# Patient Record
Sex: MALE | Race: BLACK OR AFRICAN AMERICAN | NOT HISPANIC OR LATINO | Employment: OTHER | URBAN - METROPOLITAN AREA
[De-identification: names, ages, dates, MRNs, and addresses within clinical notes are randomized per-mention and may not be internally consistent; named-entity substitution may affect disease eponyms.]

---

## 2023-01-20 PROBLEM — G57.10 MERALGIA PARAESTHETICA: Status: ACTIVE | Noted: 2023-01-20

## 2023-01-20 PROBLEM — E66.09 EXOGENOUS OBESITY: Status: ACTIVE | Noted: 2023-01-20

## 2023-01-20 PROBLEM — N28.9 RENAL INSUFFICIENCY: Status: ACTIVE | Noted: 2023-01-20

## 2023-01-20 PROBLEM — E11.9 TYPE 2 DIABETES MELLITUS (MULTI): Status: ACTIVE | Noted: 2023-01-20

## 2023-01-20 PROBLEM — K63.5 BENIGN COLON POLYP: Status: ACTIVE | Noted: 2023-01-20

## 2023-01-20 PROBLEM — E55.9 VITAMIN D DEFICIENCY: Status: ACTIVE | Noted: 2023-01-20

## 2023-01-20 PROBLEM — I10 HYPERTENSION: Status: ACTIVE | Noted: 2023-01-20

## 2023-01-20 PROBLEM — N40.0 BPH (BENIGN PROSTATIC HYPERPLASIA): Status: ACTIVE | Noted: 2023-01-20

## 2023-01-20 PROBLEM — R79.89 ABNORMAL LFTS: Status: ACTIVE | Noted: 2023-01-20

## 2023-01-20 PROBLEM — E78.5 HYPERLIPIDEMIA: Status: ACTIVE | Noted: 2023-01-20

## 2023-01-20 RX ORDER — ATORVASTATIN CALCIUM 20 MG/1
1 TABLET, FILM COATED ORAL DAILY
COMMUNITY
Start: 2020-02-12 | End: 2023-03-07 | Stop reason: SDUPTHER

## 2023-01-20 RX ORDER — DAPAGLIFLOZIN 10 MG/1
1 TABLET, FILM COATED ORAL
COMMUNITY
Start: 2022-09-13 | End: 2023-03-07 | Stop reason: SDUPTHER

## 2023-01-20 RX ORDER — LOSARTAN POTASSIUM 50 MG/1
1 TABLET ORAL DAILY
COMMUNITY
Start: 2021-05-17 | End: 2023-03-07 | Stop reason: SDUPTHER

## 2023-01-20 RX ORDER — TAMSULOSIN HYDROCHLORIDE 0.4 MG/1
2 CAPSULE ORAL DAILY
COMMUNITY
Start: 2021-01-11 | End: 2023-03-07 | Stop reason: SDUPTHER

## 2023-03-07 ENCOUNTER — OFFICE VISIT (OUTPATIENT)
Dept: PRIMARY CARE | Facility: CLINIC | Age: 68
End: 2023-03-07
Payer: MEDICARE

## 2023-03-07 VITALS
HEIGHT: 78 IN | SYSTOLIC BLOOD PRESSURE: 118 MMHG | DIASTOLIC BLOOD PRESSURE: 65 MMHG | WEIGHT: 315 LBS | HEART RATE: 64 BPM | BODY MASS INDEX: 36.45 KG/M2 | TEMPERATURE: 98.1 F

## 2023-03-07 DIAGNOSIS — I10 PRIMARY HYPERTENSION: ICD-10-CM

## 2023-03-07 DIAGNOSIS — M54.16 LUMBAR RADICULOPATHY, RIGHT: ICD-10-CM

## 2023-03-07 DIAGNOSIS — Z12.5 SCREENING FOR PROSTATE CANCER: ICD-10-CM

## 2023-03-07 DIAGNOSIS — E11.9 TYPE 2 DIABETES MELLITUS WITHOUT COMPLICATION, WITHOUT LONG-TERM CURRENT USE OF INSULIN (MULTI): Primary | ICD-10-CM

## 2023-03-07 DIAGNOSIS — E66.01 OBESITY, MORBID (MULTI): ICD-10-CM

## 2023-03-07 DIAGNOSIS — N28.9 RENAL INSUFFICIENCY: ICD-10-CM

## 2023-03-07 DIAGNOSIS — N40.1 BENIGN PROSTATIC HYPERPLASIA WITH LOWER URINARY TRACT SYMPTOMS, SYMPTOM DETAILS UNSPECIFIED: ICD-10-CM

## 2023-03-07 DIAGNOSIS — E11.9 TYPE 2 DIABETES MELLITUS WITHOUT COMPLICATION, WITHOUT LONG-TERM CURRENT USE OF INSULIN (MULTI): ICD-10-CM

## 2023-03-07 PROCEDURE — 1159F MED LIST DOCD IN RCRD: CPT | Performed by: INTERNAL MEDICINE

## 2023-03-07 PROCEDURE — 3074F SYST BP LT 130 MM HG: CPT | Performed by: INTERNAL MEDICINE

## 2023-03-07 PROCEDURE — 3078F DIAST BP <80 MM HG: CPT | Performed by: INTERNAL MEDICINE

## 2023-03-07 PROCEDURE — 4010F ACE/ARB THERAPY RXD/TAKEN: CPT | Performed by: INTERNAL MEDICINE

## 2023-03-07 PROCEDURE — 99214 OFFICE O/P EST MOD 30 MIN: CPT | Performed by: INTERNAL MEDICINE

## 2023-03-07 PROCEDURE — 1160F RVW MEDS BY RX/DR IN RCRD: CPT | Performed by: INTERNAL MEDICINE

## 2023-03-07 RX ORDER — ATORVASTATIN CALCIUM 20 MG/1
20 TABLET, FILM COATED ORAL DAILY
Qty: 90 TABLET | Refills: 3 | Status: SHIPPED | OUTPATIENT
Start: 2023-03-07 | End: 2023-03-10

## 2023-03-07 RX ORDER — LOSARTAN POTASSIUM 50 MG/1
50 TABLET ORAL DAILY
Qty: 90 TABLET | Refills: 3 | Status: SHIPPED | OUTPATIENT
Start: 2023-03-07 | End: 2024-03-07 | Stop reason: SDUPTHER

## 2023-03-07 RX ORDER — DAPAGLIFLOZIN 10 MG/1
10 TABLET, FILM COATED ORAL DAILY
Qty: 90 TABLET | Refills: 3 | Status: SHIPPED | OUTPATIENT
Start: 2023-03-07

## 2023-03-07 RX ORDER — TAMSULOSIN HYDROCHLORIDE 0.4 MG/1
0.8 CAPSULE ORAL DAILY
Qty: 180 CAPSULE | Refills: 3 | Status: SHIPPED | OUTPATIENT
Start: 2023-03-07 | End: 2024-03-07 | Stop reason: SDUPTHER

## 2023-03-07 ASSESSMENT — ENCOUNTER SYMPTOMS
POLYDIPSIA: 0
FEVER: 0
COUGH: 0
PALPITATIONS: 0
SHORTNESS OF BREATH: 0
CHILLS: 0

## 2023-03-07 NOTE — PROGRESS NOTES
"Subjective   Patient ID: Yasmani Muro is a 67 y.o. male who presents for Transfer Of Care (Coming from [place] refill on med).    67-year-old male presents today to establish care as a new patient after the penitentiary of his PCP.  He is a well-controlled diabetic with his last A1c being 6.5.  His last routine blood work was completed in June 2022.  He denies polyuria polydipsia vision changes or medication concerns at this time.  His medication list was reconciled and refills were sent to his preferred local pharmacy today.  He has no acute concerns which he wishes to address at this time.  He has previously completed colon cancer screening but the timing of which is unknown to me exactly at this time and will require further chart review.  He has never completed prostate cancer screening and I have educated the patient regarding this and encouraged him to initiate this with me today for the foreseeable future.    As part of today's visit I performed a detailed review of the patient's medical history and chart for what is available.  Total time of review was 8 minutes.         Review of Systems   Constitutional:  Negative for chills and fever.   Respiratory:  Negative for cough and shortness of breath.    Cardiovascular:  Negative for chest pain and palpitations.   Endocrine: Negative for polydipsia and polyuria.       Objective   /65   Pulse 64   Temp 36.7 °C (98.1 °F) (Temporal)   Ht 1.969 m (6' 5.5\")   Wt (!) 145 kg (318 lb 9.6 oz)   BMI 37.29 kg/m²     Physical Exam  Constitutional:       Appearance: Normal appearance.   HENT:      Head: Normocephalic and atraumatic.   Eyes:      Extraocular Movements: Extraocular movements intact.      Pupils: Pupils are equal, round, and reactive to light.   Neck:      Thyroid: No thyroid mass or thyromegaly.      Vascular: No carotid bruit.   Cardiovascular:      Rate and Rhythm: Normal rate and regular rhythm.      Heart sounds: No murmur heard.     No friction rub. No " gallop.   Pulmonary:      Effort: No respiratory distress.      Breath sounds: No wheezing, rhonchi or rales.   Musculoskeletal:      Cervical back: Neck supple.      Right lower leg: No edema.      Left lower leg: No edema.   Lymphadenopathy:      Cervical: No cervical adenopathy.   Neurological:      Mental Status: He is alert.         Assessment/Plan

## 2023-03-10 RX ORDER — ATORVASTATIN CALCIUM 20 MG/1
20 TABLET, FILM COATED ORAL DAILY
Qty: 90 TABLET | Refills: 2 | Status: SHIPPED | OUTPATIENT
Start: 2023-03-10 | End: 2024-03-04 | Stop reason: SDUPTHER

## 2023-03-14 ENCOUNTER — LAB (OUTPATIENT)
Dept: LAB | Facility: LAB | Age: 68
End: 2023-03-14
Payer: COMMERCIAL

## 2023-03-14 DIAGNOSIS — M54.16 LUMBAR RADICULOPATHY, RIGHT: ICD-10-CM

## 2023-03-14 DIAGNOSIS — I10 PRIMARY HYPERTENSION: ICD-10-CM

## 2023-03-14 DIAGNOSIS — E11.9 TYPE 2 DIABETES MELLITUS WITHOUT COMPLICATION, WITHOUT LONG-TERM CURRENT USE OF INSULIN (MULTI): ICD-10-CM

## 2023-03-14 DIAGNOSIS — N40.1 BENIGN PROSTATIC HYPERPLASIA WITH LOWER URINARY TRACT SYMPTOMS, SYMPTOM DETAILS UNSPECIFIED: ICD-10-CM

## 2023-03-14 DIAGNOSIS — N28.9 RENAL INSUFFICIENCY: ICD-10-CM

## 2023-03-14 DIAGNOSIS — Z12.5 SCREENING FOR PROSTATE CANCER: ICD-10-CM

## 2023-03-14 DIAGNOSIS — E66.01 OBESITY, MORBID (MULTI): ICD-10-CM

## 2023-03-14 LAB
ALANINE AMINOTRANSFERASE (SGPT) (U/L) IN SER/PLAS: 37 U/L (ref 10–52)
ALBUMIN (G/DL) IN SER/PLAS: 4.4 G/DL (ref 3.4–5)
ALBUMIN (MG/L) IN URINE: 97.4 MG/L
ALBUMIN/CREATININE (UG/MG) IN URINE: 66.7 UG/MG CRT (ref 0–30)
ALKALINE PHOSPHATASE (U/L) IN SER/PLAS: 62 U/L (ref 33–136)
ANION GAP IN SER/PLAS: 11 MMOL/L (ref 10–20)
ASPARTATE AMINOTRANSFERASE (SGOT) (U/L) IN SER/PLAS: 25 U/L (ref 9–39)
BILIRUBIN TOTAL (MG/DL) IN SER/PLAS: 0.7 MG/DL (ref 0–1.2)
CALCIUM (MG/DL) IN SER/PLAS: 9.6 MG/DL (ref 8.6–10.6)
CARBON DIOXIDE, TOTAL (MMOL/L) IN SER/PLAS: 27 MMOL/L (ref 21–32)
CHLORIDE (MMOL/L) IN SER/PLAS: 108 MMOL/L (ref 98–107)
CREATININE (MG/DL) IN SER/PLAS: 1.64 MG/DL (ref 0.5–1.3)
CREATININE (MG/DL) IN URINE: 146 MG/DL (ref 20–370)
ESTIMATED AVERAGE GLUCOSE FOR HBA1C: 154 MG/DL
GFR MALE: 45 ML/MIN/1.73M2
GLUCOSE (MG/DL) IN SER/PLAS: 109 MG/DL (ref 74–99)
HEMOGLOBIN A1C/HEMOGLOBIN TOTAL IN BLOOD: 7 %
POTASSIUM (MMOL/L) IN SER/PLAS: 4.4 MMOL/L (ref 3.5–5.3)
PROSTATE SPECIFIC ANTIGEN,SCREEN: 0.3 NG/ML (ref 0–4)
PROTEIN TOTAL: 6.5 G/DL (ref 6.4–8.2)
SODIUM (MMOL/L) IN SER/PLAS: 142 MMOL/L (ref 136–145)
UREA NITROGEN (MG/DL) IN SER/PLAS: 17 MG/DL (ref 6–23)

## 2023-03-14 PROCEDURE — 82043 UR ALBUMIN QUANTITATIVE: CPT

## 2023-03-14 PROCEDURE — 84153 ASSAY OF PSA TOTAL: CPT

## 2023-03-14 PROCEDURE — 83036 HEMOGLOBIN GLYCOSYLATED A1C: CPT

## 2023-03-14 PROCEDURE — 80053 COMPREHEN METABOLIC PANEL: CPT

## 2023-03-14 PROCEDURE — 36415 COLL VENOUS BLD VENIPUNCTURE: CPT

## 2023-03-14 PROCEDURE — 82570 ASSAY OF URINE CREATININE: CPT

## 2023-03-15 NOTE — RESULT ENCOUNTER NOTE
Patient's labs show the following: Well-controlled sugar with an A1c of 7.0.  Stable chronic kidney disease.  Tiny bit of protein in the urine negative of still active diabetic kidney disease but he is on a good combination of medications to control this between the Farxiga and losartan.  At his next visit I want to recheck the urine testing for protein again and if it still there I would like to consider increasing the losartan a little bit

## 2023-03-15 NOTE — RESULT ENCOUNTER NOTE
Advanced arthritis of spine in the lumbar region. Coorelates to nerve pain of thigh, confirms suspicion of cause

## 2023-03-16 ENCOUNTER — TELEPHONE (OUTPATIENT)
Dept: PRIMARY CARE | Facility: CLINIC | Age: 68
End: 2023-03-16
Payer: MEDICARE

## 2023-03-16 NOTE — TELEPHONE ENCOUNTER
----- Message from Bebo Dawn MD sent at 3/15/2023  9:06 AM EDT -----  Patient's labs show the following: Well-controlled sugar with an A1c of 7.0.  Stable chronic kidney disease.  Tiny bit of protein in the urine negative of still active diabetic kidney disease but he is on a good combination of medications to control   this between the Farxiga and losartan.  At his next visit I want to recheck the urine testing for protein again and if it still there I would like to consider increasing the losartan a little bit

## 2023-09-07 ENCOUNTER — OFFICE VISIT (OUTPATIENT)
Dept: PRIMARY CARE | Facility: CLINIC | Age: 68
End: 2023-09-07
Payer: MEDICARE

## 2023-09-07 VITALS
DIASTOLIC BLOOD PRESSURE: 69 MMHG | BODY MASS INDEX: 35.49 KG/M2 | WEIGHT: 303.2 LBS | HEART RATE: 58 BPM | SYSTOLIC BLOOD PRESSURE: 128 MMHG

## 2023-09-07 DIAGNOSIS — N52.9 ERECTILE DISORDER: ICD-10-CM

## 2023-09-07 DIAGNOSIS — R80.9 TYPE 2 DIABETES MELLITUS WITH MICROALBUMINURIA, WITHOUT LONG-TERM CURRENT USE OF INSULIN (MULTI): Primary | ICD-10-CM

## 2023-09-07 DIAGNOSIS — I10 PRIMARY HYPERTENSION: ICD-10-CM

## 2023-09-07 DIAGNOSIS — E11.29 TYPE 2 DIABETES MELLITUS WITH MICROALBUMINURIA, WITHOUT LONG-TERM CURRENT USE OF INSULIN (MULTI): Primary | ICD-10-CM

## 2023-09-07 PROBLEM — N28.9 RENAL INSUFFICIENCY: Status: RESOLVED | Noted: 2023-01-20 | Resolved: 2023-09-07

## 2023-09-07 LAB
POC HEMOGLOBIN A1C: 6.7 % (ref 4.2–6.5)
POC HEMOGLOBIN A1C: 6.7 % (ref 4.2–6.5)

## 2023-09-07 PROCEDURE — 1160F RVW MEDS BY RX/DR IN RCRD: CPT | Performed by: INTERNAL MEDICINE

## 2023-09-07 PROCEDURE — 3074F SYST BP LT 130 MM HG: CPT | Performed by: INTERNAL MEDICINE

## 2023-09-07 PROCEDURE — 3078F DIAST BP <80 MM HG: CPT | Performed by: INTERNAL MEDICINE

## 2023-09-07 PROCEDURE — 1159F MED LIST DOCD IN RCRD: CPT | Performed by: INTERNAL MEDICINE

## 2023-09-07 PROCEDURE — 1036F TOBACCO NON-USER: CPT | Performed by: INTERNAL MEDICINE

## 2023-09-07 PROCEDURE — 99213 OFFICE O/P EST LOW 20 MIN: CPT | Performed by: INTERNAL MEDICINE

## 2023-09-07 PROCEDURE — 3051F HG A1C>EQUAL 7.0%<8.0%: CPT | Performed by: INTERNAL MEDICINE

## 2023-09-07 PROCEDURE — 83036 HEMOGLOBIN GLYCOSYLATED A1C: CPT | Performed by: INTERNAL MEDICINE

## 2023-09-07 PROCEDURE — 4010F ACE/ARB THERAPY RXD/TAKEN: CPT | Performed by: INTERNAL MEDICINE

## 2023-09-07 RX ORDER — SILDENAFIL 100 MG/1
100 TABLET, FILM COATED ORAL AS NEEDED
Qty: 30 TABLET | Refills: 1 | Status: SHIPPED | OUTPATIENT
Start: 2023-09-07 | End: 2024-09-06

## 2023-09-07 ASSESSMENT — ENCOUNTER SYMPTOMS
OCCASIONAL FEELINGS OF UNSTEADINESS: 0
DEPRESSION: 0
PALPITATIONS: 0
FEVER: 0
SHORTNESS OF BREATH: 0
POLYDIPSIA: 0
LOSS OF SENSATION IN FEET: 0
CHILLS: 0
COUGH: 0

## 2023-09-07 NOTE — PROGRESS NOTES
Subjective   Patient ID: Yasmani Muro is a 68 y.o. male who presents for Follow-up.    58-year-old type II diabetic presents for routine follow-up.  He has been doing well since her last encounter.  No medication complications issues or changes to date.  He has no symptoms of polyuria polydipsia urgency frequency UTIs or skin issues to report at this time.  Since her last visit his A1c is improved from 7.0-6.7.    I cannot find evidence for previous colon cancer screening test in the patient's records after detailed review.  Discussion with patient today he does report having previous testing done but cannot recall the exact information himself.  Is been at least 5 years based on the records I have alone.  He will review his old records and see if he can find information regarding it.  If he cannot or if he does and we can confirm dates future testing will be discussed at the next visit in 6 months.    He is requesting a prescription for erectile dysfunction.         Review of Systems   Constitutional:  Negative for chills and fever.   Respiratory:  Negative for cough and shortness of breath.    Cardiovascular:  Negative for chest pain and palpitations.   Endocrine: Negative for polydipsia and polyuria.       Objective   /69 (BP Location: Left arm)   Pulse 58   Wt 138 kg (303 lb 3.2 oz)   BMI 35.49 kg/m²     Physical Exam  Constitutional:       Appearance: Normal appearance.   HENT:      Head: Normocephalic and atraumatic.   Eyes:      Extraocular Movements: Extraocular movements intact.      Pupils: Pupils are equal, round, and reactive to light.   Neck:      Thyroid: No thyroid mass or thyromegaly.      Vascular: No carotid bruit.   Cardiovascular:      Rate and Rhythm: Normal rate and regular rhythm.      Heart sounds: No murmur heard.     No friction rub. No gallop.   Pulmonary:      Effort: No respiratory distress.      Breath sounds: No wheezing, rhonchi or rales.   Musculoskeletal:      Cervical  back: Neck supple.      Right lower leg: No edema.      Left lower leg: No edema.   Lymphadenopathy:      Cervical: No cervical adenopathy.   Neurological:      Mental Status: He is alert.         Assessment/Plan   Problem List Items Addressed This Visit       Hypertension    Type 2 diabetes mellitus (CMS/HCC) - Primary    Relevant Orders    POCT glycosylated hemoglobin (Hb A1C) manually resulted (Completed)     Other Visit Diagnoses       Erectile disorder        Relevant Medications    sildenafil (Viagra) 100 mg tablet

## 2024-03-04 DIAGNOSIS — E66.01 OBESITY, MORBID (MULTI): ICD-10-CM

## 2024-03-04 DIAGNOSIS — I10 PRIMARY HYPERTENSION: ICD-10-CM

## 2024-03-04 DIAGNOSIS — N28.9 RENAL INSUFFICIENCY: ICD-10-CM

## 2024-03-04 DIAGNOSIS — E11.9 TYPE 2 DIABETES MELLITUS WITHOUT COMPLICATION, WITHOUT LONG-TERM CURRENT USE OF INSULIN (MULTI): ICD-10-CM

## 2024-03-04 DIAGNOSIS — N40.1 BENIGN PROSTATIC HYPERPLASIA WITH LOWER URINARY TRACT SYMPTOMS, SYMPTOM DETAILS UNSPECIFIED: ICD-10-CM

## 2024-03-04 DIAGNOSIS — M54.16 LUMBAR RADICULOPATHY, RIGHT: ICD-10-CM

## 2024-03-04 DIAGNOSIS — Z12.5 SCREENING FOR PROSTATE CANCER: ICD-10-CM

## 2024-03-04 RX ORDER — ATORVASTATIN CALCIUM 20 MG/1
20 TABLET, FILM COATED ORAL DAILY
Qty: 90 TABLET | Refills: 0 | Status: SHIPPED | OUTPATIENT
Start: 2024-03-04 | End: 2024-03-07 | Stop reason: SDUPTHER

## 2024-03-07 ENCOUNTER — LAB (OUTPATIENT)
Dept: LAB | Facility: LAB | Age: 69
End: 2024-03-07
Payer: MEDICARE

## 2024-03-07 ENCOUNTER — OFFICE VISIT (OUTPATIENT)
Dept: PRIMARY CARE | Facility: CLINIC | Age: 69
End: 2024-03-07
Payer: MEDICARE

## 2024-03-07 VITALS
TEMPERATURE: 97.3 F | SYSTOLIC BLOOD PRESSURE: 132 MMHG | HEIGHT: 78 IN | BODY MASS INDEX: 36.33 KG/M2 | DIASTOLIC BLOOD PRESSURE: 75 MMHG | WEIGHT: 314 LBS | HEART RATE: 88 BPM

## 2024-03-07 DIAGNOSIS — M54.16 LUMBAR RADICULOPATHY, RIGHT: ICD-10-CM

## 2024-03-07 DIAGNOSIS — E11.29 TYPE 2 DIABETES MELLITUS WITH MICROALBUMINURIA, WITHOUT LONG-TERM CURRENT USE OF INSULIN (MULTI): Primary | ICD-10-CM

## 2024-03-07 DIAGNOSIS — G89.29 CHRONIC LEFT SHOULDER PAIN: ICD-10-CM

## 2024-03-07 DIAGNOSIS — E66.01 OBESITY, MORBID (MULTI): ICD-10-CM

## 2024-03-07 DIAGNOSIS — M25.512 CHRONIC LEFT SHOULDER PAIN: ICD-10-CM

## 2024-03-07 DIAGNOSIS — I10 PRIMARY HYPERTENSION: ICD-10-CM

## 2024-03-07 DIAGNOSIS — E11.29 TYPE 2 DIABETES MELLITUS WITH MICROALBUMINURIA, WITHOUT LONG-TERM CURRENT USE OF INSULIN (MULTI): ICD-10-CM

## 2024-03-07 DIAGNOSIS — Z12.5 SCREENING FOR PROSTATE CANCER: ICD-10-CM

## 2024-03-07 DIAGNOSIS — R80.9 TYPE 2 DIABETES MELLITUS WITH MICROALBUMINURIA, WITHOUT LONG-TERM CURRENT USE OF INSULIN (MULTI): Primary | ICD-10-CM

## 2024-03-07 DIAGNOSIS — E11.9 TYPE 2 DIABETES MELLITUS WITHOUT COMPLICATION, WITHOUT LONG-TERM CURRENT USE OF INSULIN (MULTI): ICD-10-CM

## 2024-03-07 DIAGNOSIS — R80.9 TYPE 2 DIABETES MELLITUS WITH MICROALBUMINURIA, WITHOUT LONG-TERM CURRENT USE OF INSULIN (MULTI): ICD-10-CM

## 2024-03-07 DIAGNOSIS — N40.1 BENIGN PROSTATIC HYPERPLASIA WITH LOWER URINARY TRACT SYMPTOMS, SYMPTOM DETAILS UNSPECIFIED: ICD-10-CM

## 2024-03-07 DIAGNOSIS — N28.9 RENAL INSUFFICIENCY: ICD-10-CM

## 2024-03-07 DIAGNOSIS — S46.812A STRAIN OF LEFT DELTOID MUSCLE, INITIAL ENCOUNTER: ICD-10-CM

## 2024-03-07 LAB
ALBUMIN SERPL BCP-MCNC: 4.5 G/DL (ref 3.4–5)
ALP SERPL-CCNC: 81 U/L (ref 33–136)
ALT SERPL W P-5'-P-CCNC: 38 U/L (ref 10–52)
ANION GAP SERPL CALC-SCNC: 13 MMOL/L (ref 10–20)
AST SERPL W P-5'-P-CCNC: 26 U/L (ref 9–39)
BILIRUB SERPL-MCNC: 0.6 MG/DL (ref 0–1.2)
BUN SERPL-MCNC: 16 MG/DL (ref 6–23)
CALCIUM SERPL-MCNC: 9.7 MG/DL (ref 8.6–10.6)
CHLORIDE SERPL-SCNC: 106 MMOL/L (ref 98–107)
CHOLEST SERPL-MCNC: 165 MG/DL (ref 0–199)
CHOLESTEROL/HDL RATIO: 2.9
CO2 SERPL-SCNC: 28 MMOL/L (ref 21–32)
CREAT SERPL-MCNC: 1.51 MG/DL (ref 0.5–1.3)
CREAT UR-MCNC: 107.1 MG/DL (ref 20–370)
EGFRCR SERPLBLD CKD-EPI 2021: 50 ML/MIN/1.73M*2
ERYTHROCYTE [DISTWIDTH] IN BLOOD BY AUTOMATED COUNT: 14.4 % (ref 11.5–14.5)
EST. AVERAGE GLUCOSE BLD GHB EST-MCNC: 151 MG/DL
GLUCOSE SERPL-MCNC: 127 MG/DL (ref 74–99)
HBA1C MFR BLD: 6.9 %
HCT VFR BLD AUTO: 44.9 % (ref 41–52)
HDLC SERPL-MCNC: 56.9 MG/DL
HGB BLD-MCNC: 14.1 G/DL (ref 13.5–17.5)
LDLC SERPL CALC-MCNC: 93 MG/DL
MCH RBC QN AUTO: 25.8 PG (ref 26–34)
MCHC RBC AUTO-ENTMCNC: 31.4 G/DL (ref 32–36)
MCV RBC AUTO: 82 FL (ref 80–100)
MICROALBUMIN UR-MCNC: 173.1 MG/L
MICROALBUMIN/CREAT UR: 161.6 UG/MG CREAT
NON HDL CHOLESTEROL: 108 MG/DL (ref 0–149)
NRBC BLD-RTO: 0 /100 WBCS (ref 0–0)
PLATELET # BLD AUTO: 258 X10*3/UL (ref 150–450)
POTASSIUM SERPL-SCNC: 4.5 MMOL/L (ref 3.5–5.3)
PROT SERPL-MCNC: 7.1 G/DL (ref 6.4–8.2)
PSA SERPL-MCNC: 0.31 NG/ML
RBC # BLD AUTO: 5.47 X10*6/UL (ref 4.5–5.9)
SODIUM SERPL-SCNC: 142 MMOL/L (ref 136–145)
TRIGL SERPL-MCNC: 76 MG/DL (ref 0–149)
VLDL: 15 MG/DL (ref 0–40)
WBC # BLD AUTO: 4.2 X10*3/UL (ref 4.4–11.3)

## 2024-03-07 PROCEDURE — 4010F ACE/ARB THERAPY RXD/TAKEN: CPT | Performed by: INTERNAL MEDICINE

## 2024-03-07 PROCEDURE — 1160F RVW MEDS BY RX/DR IN RCRD: CPT | Performed by: INTERNAL MEDICINE

## 2024-03-07 PROCEDURE — 99214 OFFICE O/P EST MOD 30 MIN: CPT | Performed by: INTERNAL MEDICINE

## 2024-03-07 PROCEDURE — 82570 ASSAY OF URINE CREATININE: CPT

## 2024-03-07 PROCEDURE — 85027 COMPLETE CBC AUTOMATED: CPT

## 2024-03-07 PROCEDURE — 36415 COLL VENOUS BLD VENIPUNCTURE: CPT

## 2024-03-07 PROCEDURE — 83036 HEMOGLOBIN GLYCOSYLATED A1C: CPT

## 2024-03-07 PROCEDURE — 3078F DIAST BP <80 MM HG: CPT | Performed by: INTERNAL MEDICINE

## 2024-03-07 PROCEDURE — 80061 LIPID PANEL: CPT

## 2024-03-07 PROCEDURE — 82043 UR ALBUMIN QUANTITATIVE: CPT

## 2024-03-07 PROCEDURE — 80053 COMPREHEN METABOLIC PANEL: CPT

## 2024-03-07 PROCEDURE — 1159F MED LIST DOCD IN RCRD: CPT | Performed by: INTERNAL MEDICINE

## 2024-03-07 PROCEDURE — 1036F TOBACCO NON-USER: CPT | Performed by: INTERNAL MEDICINE

## 2024-03-07 PROCEDURE — 3075F SYST BP GE 130 - 139MM HG: CPT | Performed by: INTERNAL MEDICINE

## 2024-03-07 PROCEDURE — G0103 PSA SCREENING: HCPCS

## 2024-03-07 RX ORDER — TAMSULOSIN HYDROCHLORIDE 0.4 MG/1
0.8 CAPSULE ORAL DAILY
Qty: 180 CAPSULE | Refills: 3 | Status: SHIPPED | OUTPATIENT
Start: 2024-03-07

## 2024-03-07 RX ORDER — LOSARTAN POTASSIUM 50 MG/1
50 TABLET ORAL DAILY
Qty: 90 TABLET | Refills: 3 | Status: SHIPPED | OUTPATIENT
Start: 2024-03-07 | End: 2024-03-13 | Stop reason: SDUPTHER

## 2024-03-07 RX ORDER — ATORVASTATIN CALCIUM 20 MG/1
20 TABLET, FILM COATED ORAL DAILY
Qty: 90 TABLET | Refills: 3 | Status: SHIPPED | OUTPATIENT
Start: 2024-03-07 | End: 2025-03-02

## 2024-03-07 ASSESSMENT — ENCOUNTER SYMPTOMS
CHILLS: 0
COUGH: 0
POLYDIPSIA: 0
PALPITATIONS: 0
SHORTNESS OF BREATH: 0
FEVER: 0

## 2024-03-07 NOTE — PROGRESS NOTES
Subjective   Patient ID: Yasmani Muro is a 68 y.o. male who presents for No chief complaint on file..    -year-old male with type 2 diabetes presents for routine follow-up.  He has multiple ongoing concerns at this time that he wishes to have addressed.  I have ordered his follow-up blood work at this time for a full panel.  He experienced urination every 2-3 hours without any associated symptoms.  There is no significant hesitancy or weak urinary stream.  No significant nocturia noted.  He is already on max dose Flomax.  Suspect possible Farxiga urinary frequency will try off it for a week to check his routine blood work and urinary testing and see how that looks and follow-up.  Pending those results the patient experience of Farxiga we will consider irritable bladder treatment versus changing Farxiga to an alternative option.    Left shoulder discomfort in the deltoid specifically without known cause.  Ongoing more than 3 months.  No anterior posterior shoulder pain.  No burning tingling numbness radiating down to the shoulder.  No neck pain.    Chronic radiculopathy of the right leg, most commonly experience with prolonged standing or walking estimates 15 to 20 minutes before it starts and then it bothers him until he stops to rest.  This is become his new baseline as it limits his ability to do these things for longer than this amount of time.  It is associated with primarily numbness of the lower extremity knee to foot but does often involve the whole leg itself.  There is been no associated weakness sensations like her leg wants to give out or falls.  He is been dealing with this since March 2023 has completed he has a prior x-ray indicating severe diffuse arthritis to the lumbar spine.  The patient has not improved with physical therapy today.  Additional investigations and treatment options advised minimally invasive therapeutic options for pain management will be recommended and considered pending MRI  "results.         Review of Systems   Constitutional:  Negative for chills and fever.   Respiratory:  Negative for cough and shortness of breath.    Cardiovascular:  Negative for chest pain and palpitations.   Endocrine: Negative for polydipsia and polyuria.       Objective   /75   Pulse 88   Temp 36.3 °C (97.3 °F)   Ht 1.969 m (6' 5.5\")   Wt 142 kg (314 lb)   BMI 36.76 kg/m²     Physical Exam  Constitutional:       Appearance: Normal appearance.   HENT:      Head: Normocephalic and atraumatic.   Eyes:      Extraocular Movements: Extraocular movements intact.      Pupils: Pupils are equal, round, and reactive to light.   Neck:      Thyroid: No thyroid mass or thyromegaly.      Vascular: No carotid bruit.   Cardiovascular:      Rate and Rhythm: Normal rate and regular rhythm.      Heart sounds: No murmur heard.     No friction rub. No gallop.   Pulmonary:      Effort: No respiratory distress.      Breath sounds: No wheezing, rhonchi or rales.   Musculoskeletal:      Cervical back: Neck supple.      Right lower leg: No edema.      Left lower leg: No edema.   Lymphadenopathy:      Cervical: No cervical adenopathy.   Neurological:      Mental Status: He is alert.      Deep Tendon Reflexes:      Reflex Scores:       Patellar reflexes are 1+ on the right side and 1+ on the left side.       Achilles reflexes are 1+ on the right side and 1+ on the left side.        Assessment/Plan   Problem List Items Addressed This Visit             ICD-10-CM    BPH (benign prostatic hyperplasia) N40.0    Relevant Medications    atorvastatin (Lipitor) 20 mg tablet    losartan (Cozaar) 50 mg tablet    tamsulosin (Flomax) 0.4 mg 24 hr capsule    Hypertension I10    Relevant Medications    atorvastatin (Lipitor) 20 mg tablet    losartan (Cozaar) 50 mg tablet    tamsulosin (Flomax) 0.4 mg 24 hr capsule    Other Relevant Orders    Lipid Panel    CBC    Albumin , Urine Random    Hemoglobin A1C    Comprehensive Metabolic Panel    " Prostate Specific Antigen, Screen    Type 2 diabetes mellitus (CMS/[facility]) - Primary E11.9    Relevant Medications    atorvastatin (Lipitor) 20 mg tablet    losartan (Cozaar) 50 mg tablet    tamsulosin (Flomax) 0.4 mg 24 hr capsule    Other Relevant Orders    Lipid Panel    CBC    Albumin , Urine Random    Hemoglobin A1C    Comprehensive Metabolic Panel    Prostate Specific Antigen, Screen    Obesity, morbid (CMS/[facility]) E66.01    Relevant Medications    atorvastatin (Lipitor) 20 mg tablet    losartan (Cozaar) 50 mg tablet    tamsulosin (Flomax) 0.4 mg 24 hr capsule    Screening for prostate cancer Z12.5    Relevant Medications    atorvastatin (Lipitor) 20 mg tablet    losartan (Cozaar) 50 mg tablet    tamsulosin (Flomax) 0.4 mg 24 hr capsule    Other Relevant Orders    Lipid Panel    CBC    Albumin , Urine Random    Hemoglobin A1C    Comprehensive Metabolic Panel    Prostate Specific Antigen, Screen    Lumbar radiculopathy, right M54.16    Relevant Medications    atorvastatin (Lipitor) 20 mg tablet    losartan (Cozaar) 50 mg tablet    tamsulosin (Flomax) 0.4 mg 24 hr capsule    Other Relevant Orders    MR lumbar spine wo IV contrast    Referral to Pain Medicine     Other Visit Diagnoses         Codes    Chronic left shoulder pain     M25.512, G89.29    Relevant Orders    Referral to Sports Medicine    Strain of left deltoid muscle, initial encounter     S46.812A    Relevant Orders    Referral to Sports Medicine    Renal insufficiency     N28.9    Relevant Medications    atorvastatin (Lipitor) 20 mg tablet    losartan (Cozaar) 50 mg tablet    tamsulosin (Flomax) 0.4 mg 24 hr capsule

## 2024-03-13 DIAGNOSIS — N28.9 RENAL INSUFFICIENCY: ICD-10-CM

## 2024-03-13 DIAGNOSIS — E66.01 OBESITY, MORBID (MULTI): ICD-10-CM

## 2024-03-13 DIAGNOSIS — E11.9 TYPE 2 DIABETES MELLITUS WITHOUT COMPLICATION, WITHOUT LONG-TERM CURRENT USE OF INSULIN (MULTI): ICD-10-CM

## 2024-03-13 DIAGNOSIS — M54.16 LUMBAR RADICULOPATHY, RIGHT: ICD-10-CM

## 2024-03-13 DIAGNOSIS — Z12.5 SCREENING FOR PROSTATE CANCER: ICD-10-CM

## 2024-03-13 DIAGNOSIS — I10 PRIMARY HYPERTENSION: ICD-10-CM

## 2024-03-13 DIAGNOSIS — N40.1 BENIGN PROSTATIC HYPERPLASIA WITH LOWER URINARY TRACT SYMPTOMS, SYMPTOM DETAILS UNSPECIFIED: ICD-10-CM

## 2024-03-13 RX ORDER — LOSARTAN POTASSIUM 100 MG/1
100 TABLET ORAL DAILY
Qty: 90 TABLET | Refills: 3 | Status: SHIPPED | OUTPATIENT
Start: 2024-03-13 | End: 2025-03-13

## 2024-03-15 NOTE — RESULT ENCOUNTER NOTE
Possibly but lets try just the losartan first and see what we get out of it if needed we can resume that in the future, the level of protein in the urine at this time is not excessive.

## 2024-03-20 ENCOUNTER — HOSPITAL ENCOUNTER (OUTPATIENT)
Dept: RADIOLOGY | Facility: CLINIC | Age: 69
Discharge: HOME | End: 2024-03-20
Payer: MEDICARE

## 2024-03-20 DIAGNOSIS — M54.16 LUMBAR RADICULOPATHY, RIGHT: ICD-10-CM

## 2024-03-20 PROCEDURE — 72148 MRI LUMBAR SPINE W/O DYE: CPT

## 2024-03-20 PROCEDURE — 72148 MRI LUMBAR SPINE W/O DYE: CPT | Performed by: RADIOLOGY

## 2024-03-22 NOTE — RESULT ENCOUNTER NOTE
Patient's MRI shows multilevel spinal disease with various degrees of arthritis and management at multiple levels.  None of the areas are severe enough that warrant emergency consideration of surgery by any stretch but additional measures such as pain management for minimally invasive treatment such as injection therapy would be wise to consider.  The patient has not already started it I would also advised to consider physical therapy be reconsidered given the multiple levels of dysfunction.  I am happy to order either of those prior to patient follow-up but I would advise him to follow-up with me regarding his progress with the treatment plan that he elects into approximately 6 to 8 weeks later

## 2024-04-15 ENCOUNTER — OFFICE VISIT (OUTPATIENT)
Dept: PAIN MEDICINE | Facility: HOSPITAL | Age: 69
End: 2024-04-15
Payer: MEDICARE

## 2024-04-15 DIAGNOSIS — M48.062 LUMBAR STENOSIS WITH NEUROGENIC CLAUDICATION: ICD-10-CM

## 2024-04-15 DIAGNOSIS — M54.16 LUMBAR RADICULOPATHY, RIGHT: Primary | ICD-10-CM

## 2024-04-15 PROCEDURE — 1125F AMNT PAIN NOTED PAIN PRSNT: CPT | Performed by: ANESTHESIOLOGY

## 2024-04-15 PROCEDURE — 3060F POS MICROALBUMINURIA REV: CPT | Performed by: ANESTHESIOLOGY

## 2024-04-15 PROCEDURE — 99214 OFFICE O/P EST MOD 30 MIN: CPT | Performed by: ANESTHESIOLOGY

## 2024-04-15 PROCEDURE — 3044F HG A1C LEVEL LT 7.0%: CPT | Performed by: ANESTHESIOLOGY

## 2024-04-15 PROCEDURE — 1159F MED LIST DOCD IN RCRD: CPT | Performed by: ANESTHESIOLOGY

## 2024-04-15 PROCEDURE — 99204 OFFICE O/P NEW MOD 45 MIN: CPT | Performed by: ANESTHESIOLOGY

## 2024-04-15 PROCEDURE — 4010F ACE/ARB THERAPY RXD/TAKEN: CPT | Performed by: ANESTHESIOLOGY

## 2024-04-15 PROCEDURE — 3048F LDL-C <100 MG/DL: CPT | Performed by: ANESTHESIOLOGY

## 2024-04-15 PROCEDURE — 1160F RVW MEDS BY RX/DR IN RCRD: CPT | Performed by: ANESTHESIOLOGY

## 2024-04-15 RX ORDER — GABAPENTIN 300 MG/1
CAPSULE ORAL
Qty: 60 CAPSULE | Refills: 11 | Status: SHIPPED | OUTPATIENT
Start: 2024-04-15

## 2024-04-15 ASSESSMENT — PAIN - FUNCTIONAL ASSESSMENT: PAIN_FUNCTIONAL_ASSESSMENT: 0-10

## 2024-04-15 ASSESSMENT — PAIN SCALES - GENERAL: PAINLEVEL_OUTOF10: 6

## 2024-04-15 NOTE — PROGRESS NOTES
Pain Management Clinic Note     Chief Complaint: lower back pain   Referred by: Dr. Bebo Dawn     History Of Present Illness  Yasmani Muro is a 68 y.o. male with a past medical history of BPH, HTN, T2DM, chronic left shoulder pain, chronic lower back pain who presents for evaluation of left shoulder and lower back pain.  Today patient is more concerned with his shoulder pain than his back pain.  He states that his left shoulder pain has been going on for approximately 6 months, denies any trauma or inciting events.  He describes the pain as sharp, rating it a 5/10 and occurs daily.  It is located on the outside of his left arm top of shoulder down to mid bicep.  He states the pain is limited some of his physical activities, specifically being able to do push-ups.  He states he is not able to tolerate gentle motion without pain in his left shoulder.  He also notes that sleeping on the contralateral shoulder at night causes discomfort in the left side.  He denies any numbness or tingling in the arm, any neck pain, or weakness.     Regarding his back pain, patient states this pain has been going on since March of 2023, however it is not significant.  He explains that it is more of a numbness that is causing him concern.  He feels numbness on the outside and backside of his right leg.  It is worst after prolonged periods of walking, and going down flat at night.  He says sitting down and raising the leg while in a seated position helps reduce the numbness. He states the numbness is not significantly bothersome when resting but can become bothersome after long walks. He can tolerate walking for >30 minutes without needing a break.    Most recent imaging includes XR lumbar spine that showed advanced degenerative arthritis most severe L3-S1, as well as MRI of lumbar spine on 03/20/2024 that showed variable spinal canal narrowing due to combination of developmental spinal canal narrowing and degenerative  changes.    Previous treatments include physical therapy, which patient reports was minimally helpful. He has not tried medications, steroid injections or other surgical interventions.     The pain causes significant stress in the patient's life, specifically interferes with general activity, mood, walking ability, ability to perform tasks at home and/or work.  Denies any bowel or bladder incontinence, saddle anesthesia, worsening pain, weakness or falls.     Past Medical History  He has a past medical history of Encounter for general adult medical examination without abnormal findings (09/09/2021) and Pain in right ankle and joints of right foot (01/11/2021).    Surgical History  He has a past surgical history that includes Other surgical history (09/09/2021).     Social History  He reports that he has never smoked. He has never used smokeless tobacco. No history on file for alcohol use and drug use.    Family History  Family History   Problem Relation Name Age of Onset    Hypertension Father          Allergies  Patient has no known allergies.    Review of Symptoms:   Constitutional: Negative for chills, diaphoresis or fever  HENT: Negative for neck swelling  Eyes:.  Negative for eye pain  Respiratory:.  Negative for cough, shortness of breath or wheezing    Cardiovascular:.  Negative for chest pain or palpitations  Gastrointestinal:.  Negative for abdominal pain, nausea and vomiting  Genitourinary:.  Negative for urgency  Musculoskeletal:  Positive for left shoulder pain. Positive for back pain.  Denies falls within the past 3 months.  Skin: Negative for wounds or itching   Neurological: Positive for numbness. Negative for dizziness, seizures, loss of consciousness and weakness  Endo/Heme/Allergies: Does not bruise/bleed easily  Psychiatric/Behavioral: Negative for depression. The patient does not appear anxious.       PHYSICAL EXAM  Vitals signs reviewed  Constitutional:       General: Not in acute distress      Appearance: Normal appearance. Not ill-appearing.  HENT:     Head: Normocephalic and atraumatic  Eyes:     Conjunctiva/sclera: Conjunctivae normal  Cardiovascular:     Rate and Rhythm: Normal rate and regular rhythm  Pulmonary:     Effort: No respiratory distress  Abdominal:     Palpations: Abdomen is soft  Musculoskeletal: MASSEY  Skin:     General: Skin is warm and dry  Neurological:     General: No focal deficit present  Psychiatric:         Mood and Affect: Mood normal         Behavior: Behavior normal    Advanced Exam   Inspection: No gross deformities, no surgical scars  Palpation: No tenderness of patient of lumbar midline, lumbar paraspinals, bilateral SI joints  ROM: Normal range of motion of the upper extremities, normal range of motion of lumbar flexion extension  Motor: 5/5 strength upper and lower extremities  Sensory: Negative for sensory abnormalities in upper and lower extremities  Reflexes: 2+ reflexes bilateral upper and lower extremities  Lumbar: Negative straight leg raising bilaterally, negative for facet loading  Sacral: Negative Gabriel, negative Gaenslen's  Hip: Negative for pain with anterior, lateral, posterior palpation of hip joints, negative FADIR, negative for internal/external rotation of the hip, negative logroll     Last Recorded Vitals  There were no vitals taken for this visit.    Relevant Results  Current Outpatient Medications   Medication Instructions    atorvastatin (LIPITOR) 20 mg, oral, Daily    dapagliflozin propanediol (FARXIGA) 10 mg, oral, Daily, Every Morning    losartan (COZAAR) 100 mg, oral, Daily    sildenafil (VIAGRA) 100 mg, oral, As needed    tamsulosin (FLOMAX) 0.8 mg, oral, Daily         MR lumbar spine wo IV contrast 03/20/2024    Narrative  Interpreted By:  Mario Newberry,  and Vlad Bonner  STUDY:  MR LUMBAR SPINE WO IV CONTRAST;  3/20/2024 9:32 am    INDICATION:  Signs/Symptoms:Chronci R sciatica, failed PT.    COMPARISON:  Lumbosacral spine radiograph dated  03/14/2023    ACCESSION NUMBER(S):  [id number]    ORDERING CLINICIAN:  LI COREAS    TECHNIQUE:  Sagittal T1, T2, STIR, axial T1 and T2 weighted images of the lumbar  spine were acquired.    FINDINGS:  This report assumes 5 non-rib bearing lumbar vertebral bodies. The  lowest intervertebral disc will be labeled L5-S1. There is partial  sacralization of the L5 vertebral body.    Alignment: There is no striking spondylolisthesis at any level.    Vertebrae/Intervertebral Discs: There is mild height loss of the L3  at L4 vertebral bodies. Intervertebral disc heights are maintained.  There is disc desiccation at L2-L3, L3-L4, and L4-L5. There are  chronic degenerative endplate changes at multiple levels including  osteophyte formation and endplate scalloping.    Conus: The lower thoracic cord appears unremarkable. The conus  terminates at level of the mid L2 vertebral body..    There is developmental spinal canal narrowing due to short pedicles  throughout the lumbar spine.    T12-L1:  There is no posterior disc contour abnormality. There is no  spinal canal stenosis or neural foraminal narrowing. There is no  facet osteoarthropathy.    L1-2: Disc bulge, ligamentum flavum thickening, prominent posterior  epidural fat, developmental spinal canal narrowing, and mild  bilateral facet joint hypertrophic changes moderately narrow the  spinal canal. There is no significant neural foraminal stenosis.    L2-3: Disc bulge, ligamentum flavum thickening, developmental spinal  canal narrowing, and moderate bilateral facet joint hypertrophic  changes produce marked spinal canal narrowing. There is extension of  disc into the bilateral neural foramina resulting in mild right and  moderate left neural foraminal narrowing. Neural foraminal narrowing  on the left is also due to facet osteoarthropathy.    L3-4: Disc bulge, ligamentum flavum thickening, developmental spinal  canal narrowing, and moderate bilateral facet joint  hypertrophic  changes resultant moderate spinal canal narrowing. There is extension  of disc into the bilateral neural foramina and there is resulting  mild right and moderate-to-marked left neural foraminal narrowing.    L4-5: Disc bulge, ligamentum flavum thickening, developmental spinal  canal narrowing, and moderate bilateral facet joint hypertrophic  changes mildly narrows the spinal canal narrows the bilateral  recesses. There is extension of disc into the bilateral neural  foramina and there is resulting mild right and moderate-to-marked  right neural foraminal narrowing. The disc abuts the undersurface of  the exiting right L4 nerve root.    L5-S1: There is no posterior disc contour abnormality. There is no  spinal canal stenosis or neural foraminal narrowing. There is no  facet osteoarthropathy.    The prevertebral and posterior paraspinous soft tissues are  unremarkable. T2 hyperintense simple cysts within the right kidney.    Impression  1. Variable degree of spinal canal narrowing at multiple levels due  to combination of developmental spinal canal narrowing and  degenerative changes, most pronounced at the levels of L1-L2, L2-L3,  and L3-L4.    2. Additional multilevel degenerative changes of the lumbar spine as  detailed above.    I personally reviewed the images/study and I agree with the findings  as stated by resident physician Dr. Neal Chu.    MACRO:  None    Signed by: Mario Newberry 3/20/2024 10:39 AM  Dictation workstation:   [id number]         1. Lumbar radiculopathy, right  Referral to Pain Medicine           ASSESSMENT/PLAN  Yasmani Muro is a 68 y.o. male presenting for evaluation and management of left shoulder pain and right thigh numbness occurring with physical activity.  Based on history, available imaging and physical exam, the patient's primary leg symptoms are likely due to lumbar spinal canal stenosis. Regarding his shoulder pain, the etiology of the pain is unclear at this time  given the patient does have complete active range of motion of L upper extremity, however does endorse pain with push-up exercises. Will refer to a shoulder specialist for further evaluation as well as physical therapy. Regarding his lower back pain and R leg numbness, will also refer to physical therapy and start patient on gabapentin at this time. Discussed with patient the possibility of steroid epidural injection but patient at this time would like to trial physical therapy first.      Our plan is as follows:  - Referral to physical therapy for both back and shoulder pain  - Gabapentin 300 mg nightly, titrate up to 600 mg nightly after a few days if tolerating well.  - Referral to shoulder specialist for Left shoulder pain evaluation  - Continue to participate in physical therapy as well as physician directed home exercises  - Continue pain medications as prescribed  -If no relief with the above, may consider injections       Lesly Lockwood MD

## 2024-09-11 ENCOUNTER — HOSPITAL ENCOUNTER (OUTPATIENT)
Dept: RADIOLOGY | Facility: CLINIC | Age: 69
Discharge: HOME | End: 2024-09-11
Payer: MEDICARE

## 2024-09-11 ENCOUNTER — APPOINTMENT (OUTPATIENT)
Dept: PRIMARY CARE | Facility: CLINIC | Age: 69
End: 2024-09-11
Payer: MEDICARE

## 2024-09-11 VITALS
WEIGHT: 309 LBS | HEART RATE: 75 BPM | HEIGHT: 78 IN | DIASTOLIC BLOOD PRESSURE: 74 MMHG | TEMPERATURE: 97.8 F | BODY MASS INDEX: 35.75 KG/M2 | SYSTOLIC BLOOD PRESSURE: 127 MMHG

## 2024-09-11 DIAGNOSIS — R80.9 TYPE 2 DIABETES MELLITUS WITH MICROALBUMINURIA, WITHOUT LONG-TERM CURRENT USE OF INSULIN (MULTI): Primary | ICD-10-CM

## 2024-09-11 DIAGNOSIS — M54.16 LUMBAR RADICULOPATHY, RIGHT: ICD-10-CM

## 2024-09-11 DIAGNOSIS — M25.512 CHRONIC LEFT SHOULDER PAIN: ICD-10-CM

## 2024-09-11 DIAGNOSIS — N28.9 RENAL INSUFFICIENCY: ICD-10-CM

## 2024-09-11 DIAGNOSIS — E11.9 TYPE 2 DIABETES MELLITUS WITHOUT COMPLICATION, WITHOUT LONG-TERM CURRENT USE OF INSULIN (MULTI): ICD-10-CM

## 2024-09-11 DIAGNOSIS — G89.29 CHRONIC LEFT SHOULDER PAIN: ICD-10-CM

## 2024-09-11 DIAGNOSIS — N40.1 BENIGN PROSTATIC HYPERPLASIA WITH LOWER URINARY TRACT SYMPTOMS, SYMPTOM DETAILS UNSPECIFIED: ICD-10-CM

## 2024-09-11 DIAGNOSIS — Z12.5 SCREENING FOR PROSTATE CANCER: ICD-10-CM

## 2024-09-11 DIAGNOSIS — I10 PRIMARY HYPERTENSION: ICD-10-CM

## 2024-09-11 DIAGNOSIS — E11.29 TYPE 2 DIABETES MELLITUS WITH MICROALBUMINURIA, WITHOUT LONG-TERM CURRENT USE OF INSULIN (MULTI): Primary | ICD-10-CM

## 2024-09-11 DIAGNOSIS — N18.31 CKD STAGE 3A, GFR 45-59 ML/MIN (MULTI): ICD-10-CM

## 2024-09-11 DIAGNOSIS — E66.01 OBESITY, MORBID (MULTI): ICD-10-CM

## 2024-09-11 DIAGNOSIS — M48.062 SPINAL STENOSIS OF LUMBAR REGION WITH NEUROGENIC CLAUDICATION: ICD-10-CM

## 2024-09-11 LAB — POC HEMOGLOBIN A1C: 6.6 % (ref 4.2–6.5)

## 2024-09-11 PROCEDURE — 1036F TOBACCO NON-USER: CPT | Performed by: INTERNAL MEDICINE

## 2024-09-11 PROCEDURE — 3074F SYST BP LT 130 MM HG: CPT | Performed by: INTERNAL MEDICINE

## 2024-09-11 PROCEDURE — 1159F MED LIST DOCD IN RCRD: CPT | Performed by: INTERNAL MEDICINE

## 2024-09-11 PROCEDURE — 73030 X-RAY EXAM OF SHOULDER: CPT | Mod: LEFT SIDE | Performed by: RADIOLOGY

## 2024-09-11 PROCEDURE — 73030 X-RAY EXAM OF SHOULDER: CPT | Mod: LT

## 2024-09-11 PROCEDURE — 1160F RVW MEDS BY RX/DR IN RCRD: CPT | Performed by: INTERNAL MEDICINE

## 2024-09-11 PROCEDURE — 3048F LDL-C <100 MG/DL: CPT | Performed by: INTERNAL MEDICINE

## 2024-09-11 PROCEDURE — 3060F POS MICROALBUMINURIA REV: CPT | Performed by: INTERNAL MEDICINE

## 2024-09-11 PROCEDURE — G2211 COMPLEX E/M VISIT ADD ON: HCPCS | Performed by: INTERNAL MEDICINE

## 2024-09-11 PROCEDURE — 3044F HG A1C LEVEL LT 7.0%: CPT | Performed by: INTERNAL MEDICINE

## 2024-09-11 PROCEDURE — 4010F ACE/ARB THERAPY RXD/TAKEN: CPT | Performed by: INTERNAL MEDICINE

## 2024-09-11 PROCEDURE — 3078F DIAST BP <80 MM HG: CPT | Performed by: INTERNAL MEDICINE

## 2024-09-11 PROCEDURE — 3008F BODY MASS INDEX DOCD: CPT | Performed by: INTERNAL MEDICINE

## 2024-09-11 PROCEDURE — 83036 HEMOGLOBIN GLYCOSYLATED A1C: CPT | Performed by: INTERNAL MEDICINE

## 2024-09-11 PROCEDURE — 99215 OFFICE O/P EST HI 40 MIN: CPT | Performed by: INTERNAL MEDICINE

## 2024-09-11 RX ORDER — TADALAFIL 5 MG/1
5 TABLET ORAL DAILY
Qty: 90 TABLET | Refills: 3 | Status: SHIPPED | OUTPATIENT
Start: 2024-09-11 | End: 2025-09-06

## 2024-09-11 RX ORDER — LOSARTAN POTASSIUM 100 MG/1
100 TABLET ORAL DAILY
Qty: 90 TABLET | Refills: 3 | Status: SHIPPED | OUTPATIENT
Start: 2024-09-11 | End: 2025-09-11

## 2024-09-11 RX ORDER — DAPAGLIFLOZIN 10 MG/1
10 TABLET, FILM COATED ORAL DAILY
Qty: 90 TABLET | Refills: 3 | Status: SHIPPED | OUTPATIENT
Start: 2024-09-11

## 2024-09-11 RX ORDER — ATORVASTATIN CALCIUM 20 MG/1
20 TABLET, FILM COATED ORAL DAILY
Qty: 90 TABLET | Refills: 3 | Status: SHIPPED | OUTPATIENT
Start: 2024-09-11 | End: 2025-09-06

## 2024-09-11 RX ORDER — PREGABALIN 25 MG/1
CAPSULE ORAL
Qty: 42 CAPSULE | Refills: 0 | Status: SHIPPED | OUTPATIENT
Start: 2024-09-11 | End: 2024-09-25

## 2024-09-11 RX ORDER — TAMSULOSIN HYDROCHLORIDE 0.4 MG/1
0.8 CAPSULE ORAL DAILY
Qty: 180 CAPSULE | Refills: 3 | Status: SHIPPED | OUTPATIENT
Start: 2024-09-11

## 2024-09-11 RX ORDER — PREGABALIN 75 MG/1
75 CAPSULE ORAL 2 TIMES DAILY
Qty: 30 CAPSULE | Refills: 2 | Status: SHIPPED | OUTPATIENT
Start: 2024-09-24 | End: 2024-11-08

## 2024-09-11 ASSESSMENT — ENCOUNTER SYMPTOMS
SHORTNESS OF BREATH: 0
PALPITATIONS: 0
POLYDIPSIA: 0
FEVER: 0
CHILLS: 0
COUGH: 0

## 2024-09-11 NOTE — PROGRESS NOTES
Subjective   Patient ID: Yasmani Muro is a 69 y.o. male who presents for Follow-up.    69-year-old male presents today for multi purpose visit he was scheduled for diabetic follow-up but he has multiple issues ongoing at this time he wished to discuss.  Blood sugars look very good with a well-controlled A1c no additional changes advised at this time.  Patient's lab results reviewed from prior visit we will recheck and reorder a follow-up visit which he was being advised as scheduled in the near future.    The patient's chronic issues that he wishes to discuss with me are related to his chronic low back pain with sciatica.  He saw our pain management clinic back in April but has had no follow-up since that time.  The initial trial of gabapentin failed due to drowsiness side effects at lower doses of 300 mg and he elected to stop that medication.  He continues to experience significant levels of pain in his MRI from that timeframe shows spinal stenosis at multiple levels most significantly at lumbar disc to 3 and 4 and for minimal stenosis noted as well.  He has multiple levels in which mild to moderate facet arthritis is also noted.  We discussed alternative means of moving forward for treatment he was advised about alternative medication available to trial and titration was designed for him today to work through to better manage his chronic pain.  He was also referred to our orthopedic spine surgery department for evaluation given the spinal stenosis on the MRI.    He has chronic left shoulder pain described as an ache and it is diffuse through the shoulder itself kind of all over without a specific point.  Examination today shows no restricted range of motion signs of rotator cuff disease at this time.  He describes the pain as a dull ache that is worse in the evening or later in the day and he cannot lay on the shoulder as a result of pain.  No specific injury he is aware of, this is a chronic issue that has  "become more noticeable for him.  He has chronic kidney disease stage III and avoids NSAIDs as directed.  The medicines he has tried over-the-counter such as this is acetaminophen or intermittently topical Voltaren or other topical medications like IcyHot have offered no significant relief or benefit to his pain symptoms.    He continues to experience symptoms of persistent nocturia associated with awakening 2-3 times a night despite titration of Flomax.  Patient counseled about additional medication options, his medications were adjusted at this time for improved levels of control and pending his response additional steps can be taken such as referral to urology if necessary.  Close follow-up advised for medication response in combination with the treatment plan above.    Extensive visit with multiple issues addressed multiple medication issues addressed multiple conditions addressed new testing reviewed in office and ordered for follow-up.         Review of Systems   Constitutional:  Negative for chills and fever.   Respiratory:  Negative for cough and shortness of breath.    Cardiovascular:  Negative for chest pain and palpitations.   Endocrine: Negative for polydipsia and polyuria.       Objective   /74 (BP Location: Right arm, Patient Position: Sitting, BP Cuff Size: Large adult)   Pulse 75   Temp 36.6 °C (97.8 °F) (Temporal)   Ht 1.969 m (6' 5.5\")   Wt 140 kg (309 lb)   BMI 36.17 kg/m²     Physical Exam  Constitutional:       Appearance: Normal appearance.   HENT:      Head: Normocephalic and atraumatic.   Eyes:      Extraocular Movements: Extraocular movements intact.      Pupils: Pupils are equal, round, and reactive to light.   Neck:      Thyroid: No thyroid mass or thyromegaly.      Vascular: No carotid bruit.   Cardiovascular:      Rate and Rhythm: Normal rate and regular rhythm.      Heart sounds: No murmur heard.     No friction rub. No gallop.   Pulmonary:      Effort: No respiratory " distress.      Breath sounds: No wheezing, rhonchi or rales.   Musculoskeletal:      Cervical back: Neck supple.      Right lower leg: No edema.      Left lower leg: No edema.   Lymphadenopathy:      Cervical: No cervical adenopathy.   Neurological:      Mental Status: He is alert.         Assessment/Plan   Problem List Items Addressed This Visit             ICD-10-CM    BPH (benign prostatic hyperplasia) N40.0    Relevant Medications    tamsulosin (Flomax) 0.4 mg 24 hr capsule    tadalafil (Cialis) 5 mg tablet    Hypertension I10    Relevant Medications    losartan (Cozaar) 100 mg tablet    atorvastatin (Lipitor) 20 mg tablet    dapagliflozin propanediol (Farxiga) 10 mg    tamsulosin (Flomax) 0.4 mg 24 hr capsule    Type 2 diabetes mellitus with microalbuminuria, without long-term current use of insulin (Multi) - Primary E11.29, R80.9    Relevant Medications    tamsulosin (Flomax) 0.4 mg 24 hr capsule    Other Relevant Orders    POCT glycosylated hemoglobin (Hb A1C) manually resulted (Completed)    Obesity, morbid (Multi) E66.01    Relevant Medications    tamsulosin (Flomax) 0.4 mg 24 hr capsule    Screening for prostate cancer Z12.5    Relevant Medications    tamsulosin (Flomax) 0.4 mg 24 hr capsule    Lumbar radiculopathy, right M54.16    Relevant Medications    pregabalin (Lyrica) 25 mg capsule    pregabalin (Lyrica) 75 mg capsule (Start on 9/24/2024)    losartan (Cozaar) 100 mg tablet    atorvastatin (Lipitor) 20 mg tablet    dapagliflozin propanediol (Farxiga) 10 mg    tamsulosin (Flomax) 0.4 mg 24 hr capsule    Other Relevant Orders    Referral to Orthopaedic Surgery    Chronic left shoulder pain M25.512, G89.29    Relevant Orders    XR shoulder left 2+ views    Referral to Orthopaedic Surgery    CKD stage 3a, GFR 45-59 ml/min (Multi) N18.31     Other Visit Diagnoses         Codes    Spinal stenosis of lumbar region with neurogenic claudication     M48.062    Relevant Medications    pregabalin (Lyrica) 25 mg  capsule    pregabalin (Lyrica) 75 mg capsule (Start on 9/24/2024)    Other Relevant Orders    Referral to Orthopaedic Surgery    Type 2 diabetes mellitus without complication, without long-term current use of insulin (Multi)     E11.9    Relevant Medications    tamsulosin (Flomax) 0.4 mg 24 hr capsule    Renal insufficiency     N28.9    Relevant Medications    tamsulosin (Flomax) 0.4 mg 24 hr capsule

## 2024-09-16 ENCOUNTER — TELEPHONE (OUTPATIENT)
Dept: PRIMARY CARE | Facility: CLINIC | Age: 69
End: 2024-09-16

## 2024-09-23 ENCOUNTER — HOSPITAL ENCOUNTER (OUTPATIENT)
Dept: RADIOLOGY | Facility: HOSPITAL | Age: 69
Discharge: HOME | End: 2024-09-23
Payer: MEDICARE

## 2024-09-23 ENCOUNTER — OFFICE VISIT (OUTPATIENT)
Dept: ORTHOPEDIC SURGERY | Facility: HOSPITAL | Age: 69
End: 2024-09-23
Payer: MEDICARE

## 2024-09-23 VITALS — BODY MASS INDEX: 34.13 KG/M2 | HEIGHT: 78 IN | WEIGHT: 295 LBS

## 2024-09-23 DIAGNOSIS — M22.2X1 PATELLOFEMORAL SYNDROME OF RIGHT KNEE: ICD-10-CM

## 2024-09-23 DIAGNOSIS — M17.11 PRIMARY OSTEOARTHRITIS OF RIGHT KNEE: ICD-10-CM

## 2024-09-23 DIAGNOSIS — M11.20 CHONDROCALCINOSIS: ICD-10-CM

## 2024-09-23 DIAGNOSIS — M25.561 RIGHT KNEE PAIN, UNSPECIFIED CHRONICITY: Primary | ICD-10-CM

## 2024-09-23 DIAGNOSIS — M25.561 RIGHT KNEE PAIN, UNSPECIFIED CHRONICITY: ICD-10-CM

## 2024-09-23 PROCEDURE — 3048F LDL-C <100 MG/DL: CPT | Performed by: PHYSICIAN ASSISTANT

## 2024-09-23 PROCEDURE — 1159F MED LIST DOCD IN RCRD: CPT | Performed by: PHYSICIAN ASSISTANT

## 2024-09-23 PROCEDURE — 73564 X-RAY EXAM KNEE 4 OR MORE: CPT | Mod: RIGHT SIDE | Performed by: RADIOLOGY

## 2024-09-23 PROCEDURE — 99214 OFFICE O/P EST MOD 30 MIN: CPT | Performed by: PHYSICIAN ASSISTANT

## 2024-09-23 PROCEDURE — 73564 X-RAY EXAM KNEE 4 OR MORE: CPT | Mod: RT

## 2024-09-23 PROCEDURE — 4010F ACE/ARB THERAPY RXD/TAKEN: CPT | Performed by: PHYSICIAN ASSISTANT

## 2024-09-23 PROCEDURE — 99204 OFFICE O/P NEW MOD 45 MIN: CPT | Performed by: PHYSICIAN ASSISTANT

## 2024-09-23 PROCEDURE — 3060F POS MICROALBUMINURIA REV: CPT | Performed by: PHYSICIAN ASSISTANT

## 2024-09-23 PROCEDURE — 3008F BODY MASS INDEX DOCD: CPT | Performed by: PHYSICIAN ASSISTANT

## 2024-09-23 PROCEDURE — 1125F AMNT PAIN NOTED PAIN PRSNT: CPT | Performed by: PHYSICIAN ASSISTANT

## 2024-09-23 PROCEDURE — 3044F HG A1C LEVEL LT 7.0%: CPT | Performed by: PHYSICIAN ASSISTANT

## 2024-09-23 RX ORDER — MELOXICAM 15 MG/1
15 TABLET ORAL DAILY
Qty: 30 TABLET | Refills: 1 | Status: SHIPPED | OUTPATIENT
Start: 2024-09-23 | End: 2024-11-22

## 2024-09-23 ASSESSMENT — PAIN - FUNCTIONAL ASSESSMENT: PAIN_FUNCTIONAL_ASSESSMENT: 0-10

## 2024-09-23 ASSESSMENT — PAIN SCALES - GENERAL: PAINLEVEL_OUTOF10: 7

## 2024-09-23 NOTE — PATIENT INSTRUCTIONS
Patient should avoid deep flexion of the knee including kneeling, squatting or sitting in low chairs.  They should also avoid impact activities such as running and jumping but can use a stationary bike, pool exercises and upper body training.    The patient was given a prescription for physical therapy.  Physical therapy is medically necessary to improve strength, balance, range of motion and functional outcomes after injury and/or surgery.    1. Follow stretching exercises that were on a separate handout   2. Hold each stretch for a least 1 minute  3. Do not bounce while stretching  4. Stretch for 10 minutes at a time, 3x a day for 6 weeks then daily  5. Remember, it takes several weeks to a few months of consistent stretching to increase flexibility and decrease symptoms.     You can use OTC Voltaren gel or aspercream and apply it to the injured area.    Ice and elevate supported at the calf with no pressure on the heel to reduce swelling.    You were given a prescription for meloxicam for pain; take with food as directed. You can also take tylenol as directed. DO  NOT take ibuprofen or naproxen with the meloxicam.    Follow up as needed

## 2024-09-24 NOTE — PROGRESS NOTES
NPV-   History of Present Illness  69 y.o.male presents at same day walk in clinic for right knee pain  1. Right knee pain, unspecified chronicity  CANCELED: XR knee right 3 views      2. Chondrocalcinosis  Referral to Physical Therapy    meloxicam (Mobic) 15 mg tablet      3. Primary osteoarthritis of right knee  Referral to Physical Therapy    meloxicam (Mobic) 15 mg tablet      4. Patellofemoral syndrome of right knee  Referral to Physical Therapy    meloxicam (Mobic) 15 mg tablet        Mechanism of injury: coming down from ladder after painting, twisted knee  Date of Injury/Pain: 9/20/24  Location of pain: anterior knee  Frequency of Pain: worse with walking or bending  Associated symptoms? Swelling.  Previous treatment?   none  They deny any locking of the knee    27 point review of systems negative except what is stated in HPI     Constitutional Exam: patient's height and weight reviewed, well-kempt  Psychiatric Exam: alert and oriented x 3, appropriate mood and behavior  Eye Exam: DEV, EOMI  Pulmonary Exam: breathing non-labored, no apparent distress  Lymphatic exam: no appreciable lymphadenopathy in the lower extremities  Cardiovascular exam: DP pulses 2+ bilaterally, PT 2+ bilaterally, toes are pink with good capillary refill, no pitting edema  Skin exam: no open lesions, rashes, abrasions or ulcerations  Neurological exam: sensation to light touch intact in both lower extremities in peripheral and dermatomal distributions (except for any abnormalities noted in musculoskeletal exam)      On examination of the right knee:  Normal gait, neutral alignment  Minimal swelling; No effusion bruising or atrophy.  Neutral alignment.    Normal range of motion in extension and flexion.   Normal strength in flexion and extension.  No extensor lag.    Tenderness to palpation: none  No tenderness to palpation over the medial or lateral joint line, tibial plateau, femoral condyles, quadriceps tendon, patellar tendon,  patella, MCL or LCL.    Neurovascularly intact.  Normal sensation to light touch.  Popliteal, dorsalis pedis and posterior tibial pulses 2+ bilaterally.    Negative Rebecca's test.   Negative Apley's test.   Negative anterior drawer test.   Negative posterior drawer test.    Negative Lachman's.   Negative valgus stress test at 0 and 30° flexion.   Negative varus stress test at 0 and 30° flexion.   1-1 medial/lateral in 30 degrees flexion, 2-1 medial/lateral at  0 degrees with patellar glide test.   Positive patellar grind test.     I personally reviewed the patient's x-ray images and reports of the right knee. The xrays show no fractures or dislocation.  Mild degenerative changes of the knee. Chondrocalcinosis       ASSESSMENT: right knee patellofemoral syndrome, osteoarthritis, chondrocalcinosis     PLAN: Treatment options were discussed with the patient. The patient was given a prescription for physical therapy.  Physical therapy is medically necessary to improve strength, balance, range of motion and functional outcomes after injury and/or surgery. Patient should avoid deep flexion of the knee including kneeling, squatting or sitting in low chairs.  They should also avoid impact activities such as running and jumping but can use a stationary bike, pool exercises and upper body training. Patient was given a handout and instructed on an at home stretching program.  They should do these exercises 3 times per day for 6 weeks and then daily. Patient can use OTC aspercream for pain and continue to ice and elevate supported at the calf to reduce swelling. All the patient's questions were answered. The patient agrees with the above plan.  Follow up as needed

## 2024-11-21 DIAGNOSIS — M48.062 SPINAL STENOSIS OF LUMBAR REGION WITH NEUROGENIC CLAUDICATION: ICD-10-CM

## 2024-11-21 DIAGNOSIS — M54.16 LUMBAR RADICULOPATHY, RIGHT: ICD-10-CM

## 2024-11-21 RX ORDER — PREGABALIN 75 MG/1
75 CAPSULE ORAL 2 TIMES DAILY
Qty: 60 CAPSULE | Refills: 3 | Status: SHIPPED | OUTPATIENT
Start: 2024-11-21 | End: 2025-03-21

## 2024-12-12 ENCOUNTER — APPOINTMENT (OUTPATIENT)
Dept: PRIMARY CARE | Facility: CLINIC | Age: 69
End: 2024-12-12
Payer: MEDICARE

## 2024-12-12 VITALS
DIASTOLIC BLOOD PRESSURE: 82 MMHG | BODY MASS INDEX: 37.11 KG/M2 | HEART RATE: 61 BPM | SYSTOLIC BLOOD PRESSURE: 134 MMHG | WEIGHT: 315 LBS

## 2024-12-12 DIAGNOSIS — R80.9 TYPE 2 DIABETES MELLITUS WITH MICROALBUMINURIA, WITHOUT LONG-TERM CURRENT USE OF INSULIN (MULTI): ICD-10-CM

## 2024-12-12 DIAGNOSIS — E11.29 TYPE 2 DIABETES MELLITUS WITH MICROALBUMINURIA, WITHOUT LONG-TERM CURRENT USE OF INSULIN (MULTI): ICD-10-CM

## 2024-12-12 DIAGNOSIS — M48.062 SPINAL STENOSIS OF LUMBAR REGION WITH NEUROGENIC CLAUDICATION: Primary | ICD-10-CM

## 2024-12-12 LAB — POC HEMOGLOBIN A1C: 6.9 % (ref 4.2–6.5)

## 2024-12-12 PROCEDURE — 4010F ACE/ARB THERAPY RXD/TAKEN: CPT | Performed by: INTERNAL MEDICINE

## 2024-12-12 PROCEDURE — 1159F MED LIST DOCD IN RCRD: CPT | Performed by: INTERNAL MEDICINE

## 2024-12-12 PROCEDURE — 3060F POS MICROALBUMINURIA REV: CPT | Performed by: INTERNAL MEDICINE

## 2024-12-12 PROCEDURE — G2211 COMPLEX E/M VISIT ADD ON: HCPCS | Performed by: INTERNAL MEDICINE

## 2024-12-12 PROCEDURE — 99214 OFFICE O/P EST MOD 30 MIN: CPT | Performed by: INTERNAL MEDICINE

## 2024-12-12 PROCEDURE — 3075F SYST BP GE 130 - 139MM HG: CPT | Performed by: INTERNAL MEDICINE

## 2024-12-12 PROCEDURE — 1160F RVW MEDS BY RX/DR IN RCRD: CPT | Performed by: INTERNAL MEDICINE

## 2024-12-12 PROCEDURE — 3048F LDL-C <100 MG/DL: CPT | Performed by: INTERNAL MEDICINE

## 2024-12-12 PROCEDURE — 1125F AMNT PAIN NOTED PAIN PRSNT: CPT | Performed by: INTERNAL MEDICINE

## 2024-12-12 PROCEDURE — 3079F DIAST BP 80-89 MM HG: CPT | Performed by: INTERNAL MEDICINE

## 2024-12-12 PROCEDURE — 3044F HG A1C LEVEL LT 7.0%: CPT | Performed by: INTERNAL MEDICINE

## 2024-12-12 PROCEDURE — 1036F TOBACCO NON-USER: CPT | Performed by: INTERNAL MEDICINE

## 2024-12-12 PROCEDURE — 83036 HEMOGLOBIN GLYCOSYLATED A1C: CPT | Performed by: INTERNAL MEDICINE

## 2024-12-12 RX ORDER — PREGABALIN 100 MG/1
100 CAPSULE ORAL 2 TIMES DAILY
Qty: 60 CAPSULE | Refills: 3 | Status: SHIPPED | OUTPATIENT
Start: 2024-12-12 | End: 2025-04-11

## 2024-12-12 ASSESSMENT — ENCOUNTER SYMPTOMS
CHILLS: 0
SHORTNESS OF BREATH: 0
POLYDIPSIA: 0
FEVER: 0
PALPITATIONS: 0
COUGH: 0

## 2024-12-12 ASSESSMENT — PAIN SCALES - GENERAL: PAINLEVEL_OUTOF10: 6

## 2024-12-12 NOTE — PROGRESS NOTES
Subjective   Patient ID: Yasmani Muro is a 69 y.o. male who presents for Follow-up.    69-year-old male presents today for primary follow-up on spinal stenosis chronic pain recently started on pregabalin titrated up to 75 mg twice daily.  Unlike the gabapentin, he is tolerating this medication well and has had no complications or side effects to the titration to date.  There has been a moderate improvement in his chronic pain symptoms he is able to function better with less pain each day.  He has not been experiencing any new symptoms of note and has no concerns towards side effects.  He still is bothered by pain related to the spinal stenosis specifically with sleep, prolonged sitting or being in certain positions for prolonged time.  Further adjustments of the medication discussed and adjusted at this time.  We also again reviewed and discussed the possibilities of physical therapy and return to pain management for minimally invasive treatment such as injection therapy.  The patient wishes to pursue those at a later time after further titration if required or symptoms do not continue to improve.  Otherwise there are no immediate concerns, his A1c continues to be well-controlled and he will be due back for follow-up again in 3 months for reevaluation for chronic pain.         Review of Systems   Constitutional:  Negative for chills and fever.   Respiratory:  Negative for cough and shortness of breath.    Cardiovascular:  Negative for chest pain and palpitations.   Endocrine: Negative for polydipsia and polyuria.       Objective   /82   Pulse 61   Wt 144 kg (317 lb)   BMI 37.11 kg/m²     Physical Exam  Constitutional:       Appearance: Normal appearance.   HENT:      Head: Normocephalic and atraumatic.   Eyes:      Extraocular Movements: Extraocular movements intact.      Pupils: Pupils are equal, round, and reactive to light.   Neck:      Thyroid: No thyroid mass or thyromegaly.   Cardiovascular:      Rate  and Rhythm: Normal rate and regular rhythm.   Musculoskeletal:      Cervical back: Neck supple.      Right lower leg: No edema.      Left lower leg: No edema.   Neurological:      Mental Status: He is alert.         Assessment/Plan   Assessment & Plan  Spinal stenosis of lumbar region with neurogenic claudication    Orders:    pregabalin (Lyrica) 100 mg capsule; Take 1 capsule (100 mg) by mouth 2 times a day.    Disability Placard    Type 2 diabetes mellitus with microalbuminuria, without long-term current use of insulin (Multi)    Orders:    POCT glycosylated hemoglobin (Hb A1C) manually resulted

## 2025-03-12 ENCOUNTER — APPOINTMENT (OUTPATIENT)
Dept: PRIMARY CARE | Facility: CLINIC | Age: 70
End: 2025-03-12
Payer: MEDICARE

## 2025-04-09 ENCOUNTER — APPOINTMENT (OUTPATIENT)
Dept: PRIMARY CARE | Facility: CLINIC | Age: 70
End: 2025-04-09
Payer: MEDICARE

## 2025-04-09 VITALS
SYSTOLIC BLOOD PRESSURE: 115 MMHG | HEART RATE: 60 BPM | WEIGHT: 312 LBS | DIASTOLIC BLOOD PRESSURE: 63 MMHG | TEMPERATURE: 97.2 F | BODY MASS INDEX: 36.52 KG/M2

## 2025-04-09 DIAGNOSIS — R80.9 TYPE 2 DIABETES MELLITUS WITH MICROALBUMINURIA, WITHOUT LONG-TERM CURRENT USE OF INSULIN (MULTI): Primary | ICD-10-CM

## 2025-04-09 DIAGNOSIS — M54.16 LUMBAR RADICULOPATHY, RIGHT: ICD-10-CM

## 2025-04-09 DIAGNOSIS — Z12.5 SCREENING FOR PROSTATE CANCER: ICD-10-CM

## 2025-04-09 DIAGNOSIS — Z12.11 ENCOUNTER FOR SCREENING FOR MALIGNANT NEOPLASM OF COLON: ICD-10-CM

## 2025-04-09 DIAGNOSIS — E11.29 TYPE 2 DIABETES MELLITUS WITH MICROALBUMINURIA, WITHOUT LONG-TERM CURRENT USE OF INSULIN (MULTI): Primary | ICD-10-CM

## 2025-04-09 DIAGNOSIS — I10 PRIMARY HYPERTENSION: ICD-10-CM

## 2025-04-09 PROCEDURE — 4010F ACE/ARB THERAPY RXD/TAKEN: CPT | Performed by: INTERNAL MEDICINE

## 2025-04-09 PROCEDURE — 1036F TOBACCO NON-USER: CPT | Performed by: INTERNAL MEDICINE

## 2025-04-09 PROCEDURE — 1160F RVW MEDS BY RX/DR IN RCRD: CPT | Performed by: INTERNAL MEDICINE

## 2025-04-09 PROCEDURE — G2211 COMPLEX E/M VISIT ADD ON: HCPCS | Performed by: INTERNAL MEDICINE

## 2025-04-09 PROCEDURE — 1126F AMNT PAIN NOTED NONE PRSNT: CPT | Performed by: INTERNAL MEDICINE

## 2025-04-09 PROCEDURE — 3078F DIAST BP <80 MM HG: CPT | Performed by: INTERNAL MEDICINE

## 2025-04-09 PROCEDURE — 99214 OFFICE O/P EST MOD 30 MIN: CPT | Performed by: INTERNAL MEDICINE

## 2025-04-09 PROCEDURE — 1159F MED LIST DOCD IN RCRD: CPT | Performed by: INTERNAL MEDICINE

## 2025-04-09 PROCEDURE — 3074F SYST BP LT 130 MM HG: CPT | Performed by: INTERNAL MEDICINE

## 2025-04-09 RX ORDER — PREDNISONE 20 MG/1
TABLET ORAL
Qty: 12 TABLET | Refills: 0 | Status: SHIPPED | OUTPATIENT
Start: 2025-04-09

## 2025-04-09 RX ORDER — LOSARTAN POTASSIUM 100 MG/1
100 TABLET ORAL DAILY
Qty: 90 TABLET | Refills: 3 | Status: SHIPPED | OUTPATIENT
Start: 2025-04-09 | End: 2026-04-09

## 2025-04-09 ASSESSMENT — ENCOUNTER SYMPTOMS
COUGH: 0
PALPITATIONS: 0
CHILLS: 0
FEVER: 0
SHORTNESS OF BREATH: 0
POLYDIPSIA: 0

## 2025-04-09 ASSESSMENT — PAIN SCALES - GENERAL: PAINLEVEL_OUTOF10: 0-NO PAIN

## 2025-04-09 NOTE — PROGRESS NOTES
Subjective   Patient ID: Yasmani Muro is a 69 y.o. male who presents for Follow-up.    69-year-old male presents today for routine follow-up on diet-controlled diabetes.  He is otherwise in good health and has no significant acute health issues at this time.  He has chronic right lower lumbar radiculopathy of intermittent duration and severity.  Recently has been noticing increased episodes related to prolonged driving where he will be sitting for a long time and started to get some radiculopathy on the right side consistent with his past condition.  He has been using a pressure relieving pillow to moderate benefit.  He has no recent new falls injuries or traumas to associated with the changes in symptoms.  He has no significant new back pain.  No bowel or bladder symptoms.  He denies polyuria polydipsia or vision changes associated with his diabetes.  His weight is in the obese range but has not seen significant weight loss or weight gain.  He continues using his medications as directed.  We did discuss the numerous medical benefits of of aggressive and well-controlled diabetes both through diet and lifestyle as well as sometimes medication.  He was advised about medication options that could be used beneficially to manage his blood sugar and lower the risk of diabetic complications such as retinopathy, kidney disease, cardiovascular disease.  Upon further discussion the patient also understands that certain medications may offer additional side benefits such as weight loss.  Ultimately upon this conversation and all patient questions being answered he elected to start medication for more aggressive diabetic management Ozempic was advised is the first and primary option based on the patient's medical history obesity and the numerous additional benefits and I have sent that prescription to the pharmacy after patient education and teaching today.         Review of Systems   Constitutional:  Negative for chills and  fever.   Respiratory:  Negative for cough and shortness of breath.    Cardiovascular:  Negative for chest pain and palpitations.   Endocrine: Negative for polydipsia and polyuria.       Objective   /63 (BP Location: Left arm, Patient Position: Sitting, BP Cuff Size: Adult)   Pulse 60   Temp 36.2 °C (97.2 °F) (Temporal)   Wt 142 kg (312 lb)   BMI 36.52 kg/m²     Physical Exam  Constitutional:       Appearance: Normal appearance.   HENT:      Head: Normocephalic and atraumatic.   Eyes:      Extraocular Movements: Extraocular movements intact.      Pupils: Pupils are equal, round, and reactive to light.   Neck:      Thyroid: No thyroid mass or thyromegaly.      Vascular: No carotid bruit.   Cardiovascular:      Rate and Rhythm: Normal rate and regular rhythm.      Heart sounds: No murmur heard.     No friction rub. No gallop.   Pulmonary:      Effort: No respiratory distress.      Breath sounds: No wheezing, rhonchi or rales.   Musculoskeletal:      Cervical back: Neck supple.      Right lower leg: No edema.      Left lower leg: No edema.   Lymphadenopathy:      Cervical: No cervical adenopathy.   Neurological:      Mental Status: He is alert.         Assessment/Plan

## 2025-04-22 DIAGNOSIS — M48.062 SPINAL STENOSIS OF LUMBAR REGION WITH NEUROGENIC CLAUDICATION: ICD-10-CM

## 2025-04-23 RX ORDER — PREGABALIN 100 MG/1
100 CAPSULE ORAL 2 TIMES DAILY
Qty: 60 CAPSULE | Refills: 5 | Status: SHIPPED | OUTPATIENT
Start: 2025-04-23 | End: 2025-08-21

## 2025-06-02 DIAGNOSIS — M54.16 LUMBAR RADICULOPATHY, RIGHT: ICD-10-CM

## 2025-06-02 DIAGNOSIS — I10 PRIMARY HYPERTENSION: ICD-10-CM

## 2025-06-02 RX ORDER — ATORVASTATIN CALCIUM 20 MG/1
20 TABLET, FILM COATED ORAL DAILY
Qty: 90 TABLET | Refills: 3 | Status: SHIPPED | OUTPATIENT
Start: 2025-06-02 | End: 2025-06-03 | Stop reason: SDUPTHER

## 2025-06-03 DIAGNOSIS — M54.16 LUMBAR RADICULOPATHY, RIGHT: ICD-10-CM

## 2025-06-03 DIAGNOSIS — I10 PRIMARY HYPERTENSION: ICD-10-CM

## 2025-06-03 RX ORDER — ATORVASTATIN CALCIUM 20 MG/1
20 TABLET, FILM COATED ORAL DAILY
Qty: 90 TABLET | Refills: 3 | Status: SHIPPED | OUTPATIENT
Start: 2025-06-03 | End: 2026-05-29

## 2025-10-09 ENCOUNTER — APPOINTMENT (OUTPATIENT)
Dept: PRIMARY CARE | Facility: CLINIC | Age: 70
End: 2025-10-09
Payer: MEDICARE